# Patient Record
Sex: FEMALE | Race: WHITE
[De-identification: names, ages, dates, MRNs, and addresses within clinical notes are randomized per-mention and may not be internally consistent; named-entity substitution may affect disease eponyms.]

---

## 2018-11-26 ENCOUNTER — HOSPITAL ENCOUNTER (EMERGENCY)
Dept: HOSPITAL 25 - UCEAST | Age: 23
Discharge: HOME | End: 2018-11-26
Payer: COMMERCIAL

## 2018-11-26 VITALS — SYSTOLIC BLOOD PRESSURE: 129 MMHG | DIASTOLIC BLOOD PRESSURE: 76 MMHG

## 2018-11-26 DIAGNOSIS — J40: Primary | ICD-10-CM

## 2018-11-26 DIAGNOSIS — Z88.0: ICD-10-CM

## 2018-11-26 PROCEDURE — G0463 HOSPITAL OUTPT CLINIC VISIT: HCPCS

## 2018-11-26 PROCEDURE — 99202 OFFICE O/P NEW SF 15 MIN: CPT

## 2018-11-26 NOTE — UC
Respiratory Complaint HPI





- HPI Summary


HPI Summary: 





22 yo female presents with cough and chest congestion for the last 2 weeks. She 

tells me that her symptoms started 2 weeks ago as a dry cough that, after 1 week

, seemed to mildly improve, but then got worse. Cough has persisted to today. 

She has taken ruthie-selzter with no relief. She does not smoke. Cough is non-

productive. Denies fever, chills, sore throat, SOB, chest pain.





- History of Current Complaint


Chief Complaint: UCGeneralIllness


Stated Complaint: COUGH


Time Seen by Provider: 11/26/18 07:52


Hx Obtained From: Patient


Hx Last Menstrual Period: 1 week ago


Onset/Duration: Gradual Onset


Severity Currently: None


Pain Intensity: 0


Character: Cough: Nonproductive





- Allergies/Home Medications


Allergies/Adverse Reactions: 


 Allergies











Allergy/AdvReac Type Severity Reaction Status Date / Time


 


Penicillins Allergy  Unknown Verified 11/26/18 07:41





   Reaction  





   Details  











Home Medications: 


 Home Medications





Doxylamine/Phenylep/Dm/Aspirin [Ruthie-[place] Day-Night Tab Eff] 1 tab PO BID 

PRN 11/26/18 [History Confirmed 11/26/18]


Ibuprofen 2 tab PO TID PRN 11/26/18 [History Confirmed 11/26/18]











PMH/Surg Hx/FS Hx/Imm Hx





- Additional Past Medical History


Additional PMH: 





None





- Surgical History


Surgical History: Yes


Surgery Procedure, Year, and Place: Brachial plexis in right arm with finger 

reapir





- Family History


Known Family History: Positive: None





- Social History


Occupation: Student


Lives: With Family


Alcohol Use: Weekly


Substance Use Type: None


Smoking Status (MU): Never Smoked Tobacco





- Immunization History


Most Recent Tetanus Shot: UTD





Review of Systems


All Other Systems Reviewed And Are Negative: Yes


Constitutional: Positive: Negative


Skin: Positive: Negative


Eyes: Positive: Negative


ENT: Positive: Negative


Respiratory: Positive: Cough


Cardiovascular: Positive: Negative


Gastrointestinal: Positive: Negative


Neurovascular: Positive: Negative


Neurological: Positive: Negative


Psychological: Positive: Negative





Physical Exam





- Summary


Physical Exam Summary: 





GENERAL: NAD. WDWN. No pain distress.


SKIN: No rashes, sores, lesions, or open wounds.


HEENT:


            Head: AT/NC


            Eyes: Conjunctiva clear without inflammation or discharge.


            Ears: Hearing grossly normal. TMs intact, no bulging, erythema, or 

edema. 


            Nose: Nasal mucosa pink and moist. NTTP maxillary and frontal 

sinus. 


            Throat: Posterior oropharynx without exudates, erythema, or 

tonsillar enlargement.  Uvula midline.


NECK: Supple. Nontender. No lymphadenopathy. 


CHEST: Mild wheezing right lung. No r/r. No accessory muscle use. Breathing 

comfortably and in no distress.


CV:  RRR. Without m/r/g. Pulses intact. Cap refill <2seconds


NEURO: Alert. 


PSYCH: Age appropriate behavior.


Triage Information Reviewed: Yes


Vital Signs: 


 Initial Vital Signs











Temp  97.1 F   11/26/18 07:37


 


Pulse  103   11/26/18 07:37


 


Resp  20   11/26/18 07:37


 


BP  129/76   11/26/18 07:37


 


Pulse Ox  98   11/26/18 07:37











Vital Signs Reviewed: Yes





 Diagnostic Evaluation





- Laboratory


O2 Sat by Pulse Oximetry: 98





Respiratory Course/Dx





- Course


Course Of Treatment: Suspect bronchitis - given her length of symptoms and 

worsening of symptoms will treat with anbx.





- Differential Dx/Diagnosis


Provider Diagnoses: Bronchitis





Discharge





- Sign-Out/Discharge


Documenting (check all that apply): Patient Departure


All imaging exams completed and their final reports reviewed: No Studies





- Discharge Plan


Condition: Stable


Disposition: HOME


Prescriptions: 


Azithromycin TAB* [Zithromax TAB (Z-EFREN) 250 mg #6 tabs] 2 tab PO .TODAY, THEN 

1 DAILY #1 efren


Benzonatate CAP* [Tessalon 100 MG CAP*] 100 mg PO TID #21 cap


Codeine Phosphate/Guaifenesin [Guaifen-Codeine 100-10 mg/5 ml] 5 ml PO BEDTIME 

PRN #35 ml MDD 5mL


 PRN Reason: Cough


Patient Education Materials:  Acute Cough (ED)


Referrals: 


No Primary Care Phys,NOPCP [Primary Care Provider] - 


Additional Instructions: 


If you develop a fever, shortness of breath, chest pain, new or worsening 

symptoms - please call your PCP or go to the ED.


 








- Billing Disposition and Condition


Condition: STABLE


Disposition: Home